# Patient Record
Sex: FEMALE | Employment: FULL TIME | URBAN - METROPOLITAN AREA
[De-identification: names, ages, dates, MRNs, and addresses within clinical notes are randomized per-mention and may not be internally consistent; named-entity substitution may affect disease eponyms.]

---

## 2019-02-07 PROCEDURE — 87624 HPV HI-RISK TYP POOLED RSLT: CPT | Performed by: OBSTETRICS & GYNECOLOGY

## 2019-02-07 PROCEDURE — 88175 CYTOPATH C/V AUTO FLUID REDO: CPT | Performed by: OBSTETRICS & GYNECOLOGY

## 2019-05-06 PROCEDURE — 88305 TISSUE EXAM BY PATHOLOGIST: CPT | Performed by: RADIOLOGY

## 2023-02-02 ENCOUNTER — OFFICE VISIT (OUTPATIENT)
Dept: OBGYN CLINIC | Facility: CLINIC | Age: 48
End: 2023-02-02
Payer: COMMERCIAL

## 2023-02-02 VITALS
BODY MASS INDEX: 26.58 KG/M2 | WEIGHT: 150 LBS | SYSTOLIC BLOOD PRESSURE: 118 MMHG | HEART RATE: 70 BPM | RESPIRATION RATE: 20 BRPM | DIASTOLIC BLOOD PRESSURE: 70 MMHG | HEIGHT: 63 IN

## 2023-02-02 DIAGNOSIS — Z80.3 FAMILY HISTORY OF BREAST CANCER: ICD-10-CM

## 2023-02-02 DIAGNOSIS — Z12.31 ENCOUNTER FOR SCREENING MAMMOGRAM FOR MALIGNANT NEOPLASM OF BREAST: Primary | ICD-10-CM

## 2023-02-02 DIAGNOSIS — N64.89 RADIAL SCAR OF LEFT BREAST: ICD-10-CM

## 2023-02-02 PROCEDURE — 3078F DIAST BP <80 MM HG: CPT | Performed by: NURSE PRACTITIONER

## 2023-02-02 PROCEDURE — 3008F BODY MASS INDEX DOCD: CPT | Performed by: NURSE PRACTITIONER

## 2023-02-02 PROCEDURE — 3074F SYST BP LT 130 MM HG: CPT | Performed by: NURSE PRACTITIONER

## 2023-02-02 PROCEDURE — 99202 OFFICE O/P NEW SF 15 MIN: CPT | Performed by: NURSE PRACTITIONER

## 2023-02-02 NOTE — PROGRESS NOTES
Subjective:  56year old    Patient presents with:  Physical: Here for annual physical     Pt here today to establish care. Was a previous [facility] pt   Needs mammo  Is on menses so does not desire physical exam today  Denies current complaints    Review of Systems:  Pertinent items are noted in the HPI. Objective:  /70   Pulse 70   Resp 20   Ht 63\"   Wt 150 lb (68 kg)   LMP 2023     Physical Examination:  General appearance: Well dressed, well nourished in no apparent distress  Neurologic/Psychiatric: Alert and oriented to person, place and time, mood normal, affect appropriate    Assessment/Plan:      Diagnoses and all orders for this visit:    Encounter for screening mammogram for malignant neoplasm of breast  -     [facility] GEN 2D+3D SCREENING BILAT (CPT=77067/01565);  Future    Family history of breast cancer  -     [facility]    Radial scar of left breast  -     [facility] - INTERNAL        Return for annual well woman exam.

## 2023-02-09 ENCOUNTER — HOSPITAL ENCOUNTER (OUTPATIENT)
Dept: MAMMOGRAPHY | Age: 48
Discharge: HOME | End: 2023-02-09
Attending: NURSE PRACTITIONER
Payer: COMMERCIAL

## 2023-02-09 DIAGNOSIS — Z12.31 ENCOUNTER FOR SCREENING MAMMOGRAM FOR MALIGNANT NEOPLASM OF BREAST: ICD-10-CM

## 2023-02-09 PROCEDURE — 77067 SCR MAMMO BI INCL CAD: CPT | Performed by: NURSE PRACTITIONER

## 2023-02-09 PROCEDURE — 77063 BREAST TOMOSYNTHESIS BI: CPT | Performed by: NURSE PRACTITIONER

## 2023-03-07 ENCOUNTER — HOSPITAL ENCOUNTER (OUTPATIENT)
Dept: MAMMOGRAPHY | Age: 48
Discharge: HOME | End: 2023-03-07
Attending: NURSE PRACTITIONER
Payer: COMMERCIAL

## 2023-03-07 ENCOUNTER — HOSPITAL ENCOUNTER (OUTPATIENT)
Dept: ULTRASOUND IMAGING | Age: 48
Discharge: HOME | End: 2023-03-07
Attending: NURSE PRACTITIONER
Payer: COMMERCIAL

## 2023-03-07 DIAGNOSIS — R92.2 INCONCLUSIVE MAMMOGRAM: ICD-10-CM

## 2023-03-07 PROCEDURE — 77065 DX MAMMO INCL CAD UNI: CPT | Performed by: NURSE PRACTITIONER

## 2023-03-07 PROCEDURE — 77061 BREAST TOMOSYNTHESIS UNI: CPT | Performed by: NURSE PRACTITIONER

## 2023-03-07 PROCEDURE — 76642 ULTRASOUND BREAST LIMITED: CPT | Performed by: NURSE PRACTITIONER

## 2023-03-28 ENCOUNTER — OFFICE VISIT (OUTPATIENT)
Dept: OBGYN CLINIC | Facility: CLINIC | Age: 48
End: 2023-03-28
Payer: COMMERCIAL

## 2023-03-28 VITALS
HEART RATE: 102 BPM | SYSTOLIC BLOOD PRESSURE: 118 MMHG | DIASTOLIC BLOOD PRESSURE: 72 MMHG | WEIGHT: 149.38 LBS | BODY MASS INDEX: 26 KG/M2

## 2023-03-28 DIAGNOSIS — Z01.419 WELL WOMAN EXAM WITH ROUTINE GYNECOLOGICAL EXAM: Primary | ICD-10-CM

## 2023-03-28 DIAGNOSIS — N64.89 RADIAL SCAR OF LEFT BREAST: ICD-10-CM

## 2023-03-28 DIAGNOSIS — Z80.3 FAMILY HISTORY OF BREAST CANCER: ICD-10-CM

## 2023-03-28 DIAGNOSIS — Z12.4 SCREENING FOR CERVICAL CANCER: ICD-10-CM

## 2023-03-28 PROCEDURE — 3078F DIAST BP <80 MM HG: CPT | Performed by: NURSE PRACTITIONER

## 2023-03-28 PROCEDURE — 99396 PREV VISIT EST AGE 40-64: CPT | Performed by: NURSE PRACTITIONER

## 2023-03-28 PROCEDURE — 3074F SYST BP LT 130 MM HG: CPT | Performed by: NURSE PRACTITIONER

## 2023-03-28 RX ORDER — OMEPRAZOLE 20 MG/1
20 CAPSULE, DELAYED RELEASE ORAL DAILY
COMMUNITY
Start: 2023-02-26

## 2023-03-31 LAB — HPV MRNA E6/E7: NOT DETECTED

## 2025-04-07 ENCOUNTER — ANESTHESIA EVENT (OUTPATIENT)
Dept: SURGERY | Age: 50
End: 2025-04-07

## 2025-04-07 RX ORDER — MULTIVITAMIN,THER AND MINERALS
1 TABLET ORAL DAILY
COMMUNITY

## 2025-04-07 RX ORDER — SERTRALINE HYDROCHLORIDE 100 MG/1
100 TABLET, FILM COATED ORAL DAILY
COMMUNITY

## 2025-04-07 RX ORDER — DIPHENHYDRAMINE HCL 25 MG
25 CAPSULE ORAL EVERY 6 HOURS PRN
COMMUNITY

## 2025-04-07 ASSESSMENT — ACTIVITIES OF DAILY LIVING (ADL)
SENSORY_SUPPORT_DEVICES: NONE;EYEGLASSES
ADL_SHORT_OF_BREATH: NO
ADL_BEFORE_ADMISSION: INDEPENDENT
RECENT_DECLINE_ADL: NO
ADL_SCORE: 12
NEEDS_ASSIST: NO

## 2025-04-08 ENCOUNTER — ANESTHESIA (OUTPATIENT)
Dept: SURGERY | Age: 50
End: 2025-04-08

## 2025-04-08 ENCOUNTER — HOSPITAL ENCOUNTER (OUTPATIENT)
Age: 50
Discharge: HOME | End: 2025-04-08
Attending: OTOLARYNGOLOGY | Admitting: OTOLARYNGOLOGY

## 2025-04-08 LAB
B-HCG UR QL: NEGATIVE
INTERNAL PROCEDURAL CONTROLS ACCEPTABLE: YES
TEST LOT EXPIRATION DATE: NORMAL
TEST LOT NUMBER: NORMAL

## 2025-04-08 PROCEDURE — 13000036 HB COMPLEX  CASE S/U + 1ST 15 MIN: Performed by: OTOLARYNGOLOGY

## 2025-04-08 PROCEDURE — 10004651 HB RX, NO CHARGE ITEM: Performed by: OTOLARYNGOLOGY

## 2025-04-08 PROCEDURE — 13000003 HB ANESTHESIA  GENERAL EA ADD MINUTE

## 2025-04-08 PROCEDURE — 13000037 HB COMPLEX CASE EACH ADD MINUTE: Performed by: OTOLARYNGOLOGY

## 2025-04-08 PROCEDURE — 10004452 HB PACU ADDL 30 MINUTES: Performed by: OTOLARYNGOLOGY

## 2025-04-08 PROCEDURE — 10002800 HB RX 250 W HCPCS: Performed by: OTOLARYNGOLOGY

## 2025-04-08 PROCEDURE — 10002801 HB RX 250 W/O HCPCS: Performed by: OTOLARYNGOLOGY

## 2025-04-08 PROCEDURE — 81025 URINE PREGNANCY TEST: CPT

## 2025-04-08 PROCEDURE — 10006023 HB SUPPLY 272: Performed by: OTOLARYNGOLOGY

## 2025-04-08 PROCEDURE — 13000003 HB ANESTHESIA  GENERAL EA ADD MINUTE: Performed by: OTOLARYNGOLOGY

## 2025-04-08 PROCEDURE — 10002803 HB RX 637

## 2025-04-08 PROCEDURE — 10002801 HB RX 250 W/O HCPCS: Performed by: NURSE ANESTHETIST, CERTIFIED REGISTERED

## 2025-04-08 PROCEDURE — 10002800 HB RX 250 W HCPCS

## 2025-04-08 PROCEDURE — 10002807 HB RX 258

## 2025-04-08 PROCEDURE — 88300 SURGICAL PATH GROSS: CPT | Performed by: OTOLARYNGOLOGY

## 2025-04-08 PROCEDURE — 13000001 HB PHASE II RECOVERY EA 30 MINUTES: Performed by: OTOLARYNGOLOGY

## 2025-04-08 PROCEDURE — 13000002 HB ANESTHESIA  GENERAL   S/U + 1ST 15 MIN: Performed by: OTOLARYNGOLOGY

## 2025-04-08 PROCEDURE — 10004451 HB PACU RECOVERY 1ST 30 MINUTES: Performed by: OTOLARYNGOLOGY

## 2025-04-08 PROCEDURE — 10002800 HB RX 250 W HCPCS: Performed by: NURSE ANESTHETIST, CERTIFIED REGISTERED

## 2025-04-08 PROCEDURE — 13000037 HB COMPLEX CASE EACH ADD MINUTE

## 2025-04-08 RX ORDER — GLYCOPYRROLATE 0.2 MG/ML
INJECTION, SOLUTION INTRAMUSCULAR; INTRAVENOUS PRN
Status: DISCONTINUED | OUTPATIENT
Start: 2025-04-08 | End: 2025-04-08

## 2025-04-08 RX ORDER — ONDANSETRON 2 MG/ML
INJECTION INTRAMUSCULAR; INTRAVENOUS PRN
Status: DISCONTINUED | OUTPATIENT
Start: 2025-04-08 | End: 2025-04-08

## 2025-04-08 RX ORDER — HYDRALAZINE HYDROCHLORIDE 20 MG/ML
5 INJECTION INTRAMUSCULAR; INTRAVENOUS EVERY 10 MIN PRN
Status: DISCONTINUED | OUTPATIENT
Start: 2025-04-08 | End: 2025-04-08 | Stop reason: HOSPADM

## 2025-04-08 RX ORDER — NICOTINE POLACRILEX 4 MG
30 LOZENGE BUCCAL
Status: DISCONTINUED | OUTPATIENT
Start: 2025-04-08 | End: 2025-04-08 | Stop reason: HOSPADM

## 2025-04-08 RX ORDER — MIDAZOLAM HYDROCHLORIDE 1 MG/ML
INJECTION, SOLUTION INTRAMUSCULAR; INTRAVENOUS PRN
Status: DISCONTINUED | OUTPATIENT
Start: 2025-04-08 | End: 2025-04-08

## 2025-04-08 RX ORDER — SODIUM CHLORIDE, SODIUM LACTATE, POTASSIUM CHLORIDE, CALCIUM CHLORIDE 600; 310; 30; 20 MG/100ML; MG/100ML; MG/100ML; MG/100ML
INJECTION, SOLUTION INTRAVENOUS CONTINUOUS
Status: DISCONTINUED | OUTPATIENT
Start: 2025-04-08 | End: 2025-04-08 | Stop reason: HOSPADM

## 2025-04-08 RX ORDER — LIDOCAINE HYDROCHLORIDE 10 MG/ML
5 INJECTION, SOLUTION EPIDURAL; INFILTRATION; INTRACAUDAL; PERINEURAL PRN
Status: DISCONTINUED | OUTPATIENT
Start: 2025-04-08 | End: 2025-04-08 | Stop reason: HOSPADM

## 2025-04-08 RX ORDER — DIPHENHYDRAMINE HYDROCHLORIDE 50 MG/ML
12.5 INJECTION, SOLUTION INTRAMUSCULAR; INTRAVENOUS
Status: DISCONTINUED | OUTPATIENT
Start: 2025-04-08 | End: 2025-04-08 | Stop reason: HOSPADM

## 2025-04-08 RX ORDER — ONDANSETRON 2 MG/ML
4 INJECTION INTRAMUSCULAR; INTRAVENOUS
Status: DISCONTINUED | OUTPATIENT
Start: 2025-04-08 | End: 2025-04-08 | Stop reason: HOSPADM

## 2025-04-08 RX ORDER — 0.9 % SODIUM CHLORIDE 0.9 %
2 VIAL (ML) INJECTION EVERY 12 HOURS SCHEDULED
Status: DISCONTINUED | OUTPATIENT
Start: 2025-04-08 | End: 2025-04-08 | Stop reason: HOSPADM

## 2025-04-08 RX ORDER — PROPOFOL 10 MG/ML
INJECTION, EMULSION INTRAVENOUS PRN
Status: DISCONTINUED | OUTPATIENT
Start: 2025-04-08 | End: 2025-04-08

## 2025-04-08 RX ORDER — DEXTROSE MONOHYDRATE 25 G/50ML
25 INJECTION, SOLUTION INTRAVENOUS PRN
Status: DISCONTINUED | OUTPATIENT
Start: 2025-04-08 | End: 2025-04-08 | Stop reason: HOSPADM

## 2025-04-08 RX ORDER — NEOSTIGMINE METHYLSULFATE 4 MG/4 ML
SYRINGE (ML) INTRAVENOUS PRN
Status: DISCONTINUED | OUTPATIENT
Start: 2025-04-08 | End: 2025-04-08

## 2025-04-08 RX ORDER — OXYMETAZOLINE HYDROCHLORIDE 0.05 G/100ML
SPRAY NASAL PRN
Status: DISCONTINUED | OUTPATIENT
Start: 2025-04-08 | End: 2025-04-08 | Stop reason: HOSPADM

## 2025-04-08 RX ORDER — 0.9 % SODIUM CHLORIDE 0.9 %
10 VIAL (ML) INJECTION PRN
Status: DISCONTINUED | OUTPATIENT
Start: 2025-04-08 | End: 2025-04-08 | Stop reason: HOSPADM

## 2025-04-08 RX ORDER — ACETAMINOPHEN 500 MG
1000 TABLET ORAL
Status: COMPLETED | OUTPATIENT
Start: 2025-04-08 | End: 2025-04-08

## 2025-04-08 RX ORDER — HYDROCODONE BITARTRATE AND ACETAMINOPHEN 7.5; 325 MG/1; MG/1
1 TABLET ORAL EVERY 6 HOURS PRN
Status: DISCONTINUED | OUTPATIENT
Start: 2025-04-08 | End: 2025-04-08 | Stop reason: HOSPADM

## 2025-04-08 RX ORDER — LIDOCAINE HYDROCHLORIDE 20 MG/ML
INJECTION, SOLUTION INFILTRATION; PERINEURAL PRN
Status: DISCONTINUED | OUTPATIENT
Start: 2025-04-08 | End: 2025-04-08

## 2025-04-08 RX ORDER — SCOPOLAMINE 1 MG/3D
1 PATCH, EXTENDED RELEASE TRANSDERMAL PRN
Status: DISCONTINUED | OUTPATIENT
Start: 2025-04-08 | End: 2025-04-08 | Stop reason: HOSPADM

## 2025-04-08 RX ORDER — ROCURONIUM BROMIDE 10 MG/ML
INJECTION, SOLUTION INTRAVENOUS PRN
Status: DISCONTINUED | OUTPATIENT
Start: 2025-04-08 | End: 2025-04-08

## 2025-04-08 RX ORDER — FAMOTIDINE 20 MG/1
20 TABLET, FILM COATED ORAL
Status: COMPLETED | OUTPATIENT
Start: 2025-04-08 | End: 2025-04-08

## 2025-04-08 RX ORDER — DEXAMETHASONE SODIUM PHOSPHATE 4 MG/ML
INJECTION, SOLUTION INTRA-ARTICULAR; INTRALESIONAL; INTRAMUSCULAR; INTRAVENOUS; SOFT TISSUE PRN
Status: DISCONTINUED | OUTPATIENT
Start: 2025-04-08 | End: 2025-04-08

## 2025-04-08 RX ORDER — DROPERIDOL 2.5 MG/ML
0.62 INJECTION, SOLUTION INTRAMUSCULAR; INTRAVENOUS
Status: DISCONTINUED | OUTPATIENT
Start: 2025-04-08 | End: 2025-04-08 | Stop reason: HOSPADM

## 2025-04-08 RX ADMIN — ROCURONIUM BROMIDE 40 MG: 10 INJECTION INTRAVENOUS at 07:35

## 2025-04-08 RX ADMIN — WATER 2000 MG: 1 INJECTION INTRAMUSCULAR; INTRAVENOUS; SUBCUTANEOUS at 07:44

## 2025-04-08 RX ADMIN — DEXAMETHASONE SODIUM PHOSPHATE 8 MG: 4 INJECTION INTRA-ARTICULAR; INTRALESIONAL; INTRAMUSCULAR; INTRAVENOUS; SOFT TISSUE at 07:50

## 2025-04-08 RX ADMIN — PROPOFOL 200 MG: 10 INJECTION, EMULSION INTRAVENOUS at 07:35

## 2025-04-08 RX ADMIN — FAMOTIDINE 20 MG: 20 TABLET, FILM COATED ORAL at 07:11

## 2025-04-08 RX ADMIN — GLYCOPYRROLATE 0.4 MG: 0.2 INJECTION, SOLUTION INTRAMUSCULAR; INTRAVENOUS at 09:13

## 2025-04-08 RX ADMIN — SODIUM CHLORIDE, POTASSIUM CHLORIDE, SODIUM LACTATE AND CALCIUM CHLORIDE: 600; 310; 30; 20 INJECTION, SOLUTION INTRAVENOUS at 07:13

## 2025-04-08 RX ADMIN — MIDAZOLAM HYDROCHLORIDE 2 MG: 1 INJECTION, SOLUTION INTRAMUSCULAR; INTRAVENOUS at 07:30

## 2025-04-08 RX ADMIN — ROCURONIUM BROMIDE 10 MG: 10 INJECTION INTRAVENOUS at 08:01

## 2025-04-08 RX ADMIN — FENTANYL CITRATE 50 MCG: 50 INJECTION INTRAMUSCULAR; INTRAVENOUS at 09:24

## 2025-04-08 RX ADMIN — LIDOCAINE HYDROCHLORIDE 2 ML: 20 INJECTION, SOLUTION INFILTRATION; PERINEURAL at 07:35

## 2025-04-08 RX ADMIN — ONDANSETRON 4 MG: 2 INJECTION INTRAMUSCULAR; INTRAVENOUS at 09:13

## 2025-04-08 RX ADMIN — FENTANYL CITRATE 25 MCG: 50 INJECTION INTRAMUSCULAR; INTRAVENOUS at 10:37

## 2025-04-08 RX ADMIN — ACETAMINOPHEN 1000 MG: 500 TABLET ORAL at 07:11

## 2025-04-08 RX ADMIN — Medication 3 MG: at 09:13

## 2025-04-08 RX ADMIN — FENTANYL CITRATE 25 MCG: 50 INJECTION INTRAMUSCULAR; INTRAVENOUS at 07:41

## 2025-04-08 RX ADMIN — FENTANYL CITRATE 25 MCG: 50 INJECTION INTRAMUSCULAR; INTRAVENOUS at 07:35

## 2025-04-08 ASSESSMENT — PAIN SCALES - GENERAL
PAINLEVEL_OUTOF10: 0
PAINLEVEL_OUTOF10: 5
PAINLEVEL_OUTOF10: 0
PAINLEVEL_OUTOF10: 2
PAINLEVEL_OUTOF10: 0
PAINLEVEL_OUTOF10: 4
PAINLEVEL_OUTOF10: 0
PAINLEVEL_OUTOF10: 1
PAINLEVEL_OUTOF10: 0
PAINLEVEL_OUTOF10: 5
PAINLEVEL_OUTOF10: 4
PAINLEVEL_OUTOF10: 0

## 2025-04-10 VITALS
TEMPERATURE: 97.5 F | HEART RATE: 85 BPM | BODY MASS INDEX: 27.3 KG/M2 | RESPIRATION RATE: 20 BRPM | OXYGEN SATURATION: 99 % | WEIGHT: 154.1 LBS | HEIGHT: 63 IN | SYSTOLIC BLOOD PRESSURE: 110 MMHG | DIASTOLIC BLOOD PRESSURE: 72 MMHG

## 2025-04-18 LAB
ASR DISCLAIMER: NORMAL
CASE RPRT: NORMAL
CLINICAL INFO: NORMAL
PATH REPORT.FINAL DX SPEC: NORMAL
PATH REPORT.GROSS SPEC: NORMAL

## (undated) DEVICE — SYRINGE TUBERCULIN 1 ML SLIP TIP BD

## (undated) DEVICE — SUTURE PDS II 5-0 PS-2 L18 IN MONO UNDYED ABS

## (undated) DEVICE — GLOVE SURG 8 PROTEXIS PWDR FREE SMTH BEAD CUFF NEU-THERA

## (undated) DEVICE — SUTURE ETHILON BLK 6-0 P-3 L18 IN MONO NABSB

## (undated) DEVICE — GOWN SURG 2XL FABRIC ASTOUND BLUE LVL 3 NONREINFORCE SET IN

## (undated) DEVICE — SUTURE PDS II 5-0 P-3 L18 IN MONO UNDYED ABS

## (undated) DEVICE — GLOVE SURG 6.5 PROTEXIS LF CRM PF BEAD CUFF STRL PLISPRN

## (undated) DEVICE — FORCEPS ESURG 4IN LIBERTY JWLR .4MM STRGT SS

## (undated) DEVICE — GLOVE SURG 7 PROTEXIS PI CLASSIC PWDR FREE BEAD CUFF PLISPRN

## (undated) DEVICE — SPONGE SURG 4X4IN CTN 16 PLY XRY DTCT STRL LF DISP

## (undated) DEVICE — BLADE SHAVER OD2 MM M4 ROTATABLE XPS INFERIOR TURBINATE

## (undated) DEVICE — GOWN SURG LG FABRIC ASTOUND BLUE LVL 3 NONREINFORCE SET IN

## (undated) DEVICE — CONTAINER SPEC 4 OZ STD SCR TOP LID GRAD TRANS GENT-L-KARE

## (undated) DEVICE — SPLINT NSL MED LF NS DISP THRMSPLNT

## (undated) DEVICE — TUBING SCT CLR 12FT 316IN MDVC MAXI-GRIP NCDTV MALE TO MALE

## (undated) DEVICE — NEEDLE HPO L1 12 IN OD16 GA REG WALL BD PP REG BVL LL HUB

## (undated) DEVICE — STAPLER SKIN L3.9 MM X W6.9 MM WIDE 35 COUNT FX HEAD RCHT

## (undated) DEVICE — TOWEL OR BLU 16X26IN 4PK

## (undated) DEVICE — SPONGE GAUZE L4 IN X W4 IN 12 PLY WOVEN FOLD EDGE TYPE VII

## (undated) DEVICE — SYRINGE 30 ML CONC TIP GRAD NONPYROGENIC DEHP FREE LOK MED

## (undated) DEVICE — ARM ESCP L2.77 IN X W.93 IN STD ARWY SIL SEPTAL NSL SPLINT

## (undated) DEVICE — Device

## (undated) DEVICE — GLOVE SURG 6.5 PROTEXIS LF BLUE PF SMTH BEAD CUFF INTLK STRL

## (undated) DEVICE — APPLICATOR BNZN TNCT .6ML STRSTRP SKNCLS NONHYPOALLERGENIC

## (undated) DEVICE — SUTURE COAT VICRYL 4-0 P-3 L18 IN BRAID COAT UNDYED ABS

## (undated) DEVICE — SOLUTION IRR 0.9% NA CL 1000 ML PLASTIC POUR BTL ISOTONIC

## (undated) DEVICE — SUTURE PROLENE BLUE 3-0 FS-1 L18 IN MONO NABSB

## (undated) DEVICE — OINTMENT ANBTC CRD LF

## (undated) DEVICE — NEEDLE FLTR BD OD18 GA L1 12 IN 5 UM REG WALL BLUNT BVL LL

## (undated) DEVICE — STRIP 4X.5IN STRSTRP IPHR POLY POR ADH REINFORCE

## (undated) DEVICE — GLOVE SURG 7.5 PROTEXIS BLUE PI PWDR FREE SMTH BEAD CUFF INTLK